# Patient Record
Sex: FEMALE | Race: WHITE | URBAN - METROPOLITAN AREA
[De-identification: names, ages, dates, MRNs, and addresses within clinical notes are randomized per-mention and may not be internally consistent; named-entity substitution may affect disease eponyms.]

---

## 2017-06-09 ENCOUNTER — THERAPY VISIT (OUTPATIENT)
Dept: PHYSICAL THERAPY | Facility: CLINIC | Age: 37
End: 2017-06-09
Payer: COMMERCIAL

## 2017-06-09 DIAGNOSIS — M79.676 GREAT TOE PAIN, UNSPECIFIED LATERALITY: Primary | ICD-10-CM

## 2017-06-09 DIAGNOSIS — Z47.89 AFTERCARE FOLLOWING SURGERY OF THE MUSCULOSKELETAL SYSTEM: ICD-10-CM

## 2017-06-09 PROCEDURE — 97110 THERAPEUTIC EXERCISES: CPT | Mod: GP | Performed by: PHYSICAL THERAPIST

## 2017-06-09 PROCEDURE — 97161 PT EVAL LOW COMPLEX 20 MIN: CPT | Mod: GP | Performed by: PHYSICAL THERAPIST

## 2017-06-09 NOTE — PROGRESS NOTES
Subjective:    Patient is a 36 year old female presenting with rehab right ankle/foot hpi.   Samra Go is a 36 year old female with a bilateral feet condition.  Condition occurred with:  Repetition/overuse.  Condition occurred: during recreation/sport.  This is a new and chronic condition  5/24/17 patient underwent bilateral 1st MTP cheilectomy with bone fragment removal from the right 1st IP joint.  She had hallux rigidus bilaterally due to years of dance.    Patient reports pain:  Great toe.  Radiates to:  No radiation.  Pain is described as aching and sharp and is intermittent and reported as 2/10.  Associated symptoms:  Loss of strength, loss of motion/stiffness and edema. Pain is the same all the time.  Symptoms are exacerbated by weight bearing, walking, ascending stairs, descending stairs, running and bending/squatting and relieved by rest and ice.  Since onset symptoms are gradually improving.  Special tests:  X-ray.  Previous treatment includes surgery.  Improvement with previous treatment: too early to guage improvement.  General health as reported by patient is good.  Pertinent medical history includes:  None.  Medical allergies: yes (sulfa medication).  Other surgeries include:  Orthopedic surgery (B feet).  Current medications:  Other (allergy meds, birth control).  Current occupation is [employer].  Patient is working in normal job without restrictions.  Primary job tasks include:  Prolonged sitting, driving and other (computer work).    Barriers include:  None as reported by patient.    Red flags:  None as reported by patient.                        Objective:  ANKLE:     PROM L PROM R AROM L AROM R MMT L MMT R   Dorsiflexion 20 20 20 20 5-/5 5-/5   Plantarflexion 60 60 60 60 5-/5 5-/5   Inversion 45 45 45 45 5-/5 5-/5   Eversion 15 15 15 15 5-/5 5-/5   G Toe Ext 25 25 nt nt nt nt   G Toe Flex 15 15 nt nt nt nt     Edema:    General: mild post-surgical edema noted in bilateral 1st MTP, Right  IP    Palpation: tender about surgical incisions bilaterally    Gait: mild antalgia, lacking normal toe off/push off bilaterally, slight roll to outer half of foot bilaterally    System    Physical Exam    General     ROS    Assessment/Plan:      Patient is a 36 year old female with bilateral ankle complaints.    Patient has the following significant findings with corresponding treatment plan.                Diagnosis 1:  S/p B 1st MTP cheilectomy    Pain -  hot/cold therapy, US, manual therapy, self management, education and home program  Decreased ROM/flexibility - manual therapy, therapeutic exercise and home program  Decreased joint mobility - manual therapy, therapeutic exercise and home program  Decreased strength - therapeutic exercise, therapeutic activities and home program  Decreased proprioception - neuro re-education, therapeutic activities and home program  Edema - cold therapy and self management/home program  Impaired gait - gait training and home program  Decreased function - therapeutic activities and home program    Therapy Evaluation Codes:   1) History comprised of:   Personal factors that impact the plan of care:      None.    Comorbidity factors that impact the plan of care are:      None.     Medications impacting care: None.  2) Examination of Body Systems comprised of:   Body structures and functions that impact the plan of care:      Toes.   Activity limitations that impact the plan of care are:      Jumping, Running, Squatting/kneeling, Stairs and Walking.  3) Clinical presentation characteristics are:   Stable/Uncomplicated.  4) Decision-Making    Low complexity using standardized patient assessment instrument and/or measureable assessment of functional outcome.  Cumulative Therapy Evaluation is: Low complexity.    Previous and current functional limitations:  (See Goal Flow Sheet for this information)    Short term and Long term goals: (See Goal Flow Sheet for this information)      Communication ability:  Patient appears to be able to clearly communicate and understand verbal and written communication and follow directions correctly.  Treatment Explanation - The following has been discussed with the patient:   RX ordered/plan of care  Anticipated outcomes  Possible risks and side effects  This patient would benefit from PT intervention to resume normal activities.   Rehab potential is good.    Frequency:  1 X week, once daily  Duration:  for 10 weeks  Discharge Plan:  Achieve all LTG.  Independent in home treatment program.  Reach maximal therapeutic benefit.    Please refer to the daily flowsheet for treatment today, total treatment time and time spent performing 1:1 timed codes.

## 2017-06-09 NOTE — LETTER
[facility]  [address]  [place] [address]  [phone]    2017    Re: Samra Go   :   1980  MRN:  [id number]   REFERRING PHYSICIAN:   [facility]    Date of Initial Evaluation:  17  Visits:  Rxs Used: 1  Reason for Referral:     Great toe pain, unspecified laterality  Aftercare following surgery of the musculoskeletal system    EVALUATION SUMMARY    Subjective:  Patient is a 36 year old female presenting with rehab right ankle/foot hpi.   Samra Go is a 36 year old female with a bilateral feet condition.  Condition occurred with:  Repetition/overuse.  Condition occurred: during recreation/sport.  This is a new and chronic condition  17 patient underwent bilateral 1st MTP cheilectomy with bone fragment removal from the right 1st IP joint.  She had hallux rigidus bilaterally due to years of dance.    Patient reports pain:  Great toe.  Radiates to:  No radiation.  Pain is described as aching and sharp and is intermittent and reported as 2/10.  Associated symptoms:  Loss of strength, loss of motion/stiffness and edema. Pain is the same all the time.  Symptoms are exacerbated by weight bearing, walking, ascending stairs, descending stairs, running and bending/squatting and relieved by rest and ice.  Since onset symptoms are gradually improving.  Special tests:  X-ray.  Previous treatment includes surgery.  Improvement with previous treatment: too early to guage improvement.  General health as reported by patient is good.  Pertinent medical history includes:  None.  Medical allergies: yes (sulfa medication).  Other surgeries include:  Orthopedic surgery (B feet).  Current medications:  Other (allergy meds, birth control).  Current occupation is [employer].  Patient is working in normal job without restrictions.  Primary job tasks include:  Prolonged sitting, driving and other (computer work).    Barriers include:  None as reported by  patient.    Red flags:  None as reported by patient.    Objective:  ANKLE:     PROM L PROM R AROM L AROM R MMT L MMT R   Dorsiflexion 20 20 20 20 5-/5 5-/5   Plantarflexion 60 60 60 60 5-/5 5-/5   Inversion 45 45 45 45 5-/5 5-/5   Eversion 15 15 15 15 5-/5 5-/5   G Toe Ext 25 25 nt nt nt nt   G Toe Flex 15 15 nt nt nt nt     Edema:    General: mild post-surgical edema noted in bilateral 1st MTP, Right IP    Palpation: tender about surgical incisions bilaterally    Gait: mild antalgia, lacking normal toe off/push off bilaterally, slight roll to outer half of foot bilaterally    Assessment/Plan:    Patient is a 36 year old female with bilateral ankle complaints.    Patient has the following significant findings with corresponding treatment plan.                Diagnosis 1:  S/p B 1st MTP cheilectomy    Pain -  hot/cold therapy, US, manual therapy, self management, education and home program  Decreased ROM/flexibility - manual therapy, therapeutic exercise and home program  Decreased joint mobility - manual therapy, therapeutic exercise and home program  Decreased strength - therapeutic exercise, therapeutic activities and home program  Decreased proprioception - neuro re-education, therapeutic activities and home program  Edema - cold therapy and self management/home program  Impaired gait - gait training and home program  Decreased function - therapeutic activities and home program    Therapy Evaluation Codes:   1) History comprised of:   Personal factors that impact the plan of care:      None.    Comorbidity factors that impact the plan of care are:      None.     Medications impacting care: None.  2) Examination of Body Systems comprised of:   Body structures and functions that impact the plan of care:      Toes.   Activity limitations that impact the plan of care are:      Jumping, Running, Squatting/kneeling, Stairs and Walking.  3) Clinical presentation characteristics  are:   Stable/Uncomplicated.  4) Decision-Making    Low complexity using standardized patient assessment instrument and/or measureable assessment of functional outcome.    Cumulative Therapy Evaluation is: Low complexity.  Previous and current functional limitations:  (See Goal Flow Sheet for this information)    Short term and Long term goals: (See Goal Flow Sheet for this information)   Communication ability:  Patient appears to be able to clearly communicate and understand verbal and written communication and follow directions correctly.  Treatment Explanation - The following has been discussed with the patient:   RX ordered/plan of care  Anticipated outcomes  Possible risks and side effects  This patient would benefit from PT intervention to resume normal activities.   Rehab potential is good.    Frequency:  1 X week, once daily  Duration:  for 10 weeks  Discharge Plan:  Achieve all LTG.  Independent in home treatment program.  Reach maximal therapeutic benefit.    Thank you for your referral.    INQUIRIES  Therapist: Alberto Nathan DPT, SCS  [facility]  [address] [place] [address]  Phone: [phone]  Fax: [phone]

## 2017-06-16 ENCOUNTER — THERAPY VISIT (OUTPATIENT)
Dept: PHYSICAL THERAPY | Facility: CLINIC | Age: 37
End: 2017-06-16
Payer: COMMERCIAL

## 2017-06-16 DIAGNOSIS — Z47.89 AFTERCARE FOLLOWING SURGERY OF THE MUSCULOSKELETAL SYSTEM: ICD-10-CM

## 2017-06-16 DIAGNOSIS — M79.676 GREAT TOE PAIN, UNSPECIFIED LATERALITY: ICD-10-CM

## 2017-06-16 PROCEDURE — 97140 MANUAL THERAPY 1/> REGIONS: CPT | Mod: GP | Performed by: PHYSICAL THERAPIST

## 2017-06-16 PROCEDURE — 97110 THERAPEUTIC EXERCISES: CPT | Mod: GP | Performed by: PHYSICAL THERAPIST

## 2017-06-22 ENCOUNTER — THERAPY VISIT (OUTPATIENT)
Dept: PHYSICAL THERAPY | Facility: CLINIC | Age: 37
End: 2017-06-22
Payer: COMMERCIAL

## 2017-06-22 DIAGNOSIS — Z47.89 AFTERCARE FOLLOWING SURGERY OF THE MUSCULOSKELETAL SYSTEM: ICD-10-CM

## 2017-06-22 DIAGNOSIS — M79.676 GREAT TOE PAIN, UNSPECIFIED LATERALITY: ICD-10-CM

## 2017-06-22 PROCEDURE — 97110 THERAPEUTIC EXERCISES: CPT | Mod: GP | Performed by: PHYSICAL THERAPIST

## 2017-06-22 PROCEDURE — 97140 MANUAL THERAPY 1/> REGIONS: CPT | Mod: GP | Performed by: PHYSICAL THERAPIST

## 2017-06-26 ENCOUNTER — THERAPY VISIT (OUTPATIENT)
Dept: PHYSICAL THERAPY | Facility: CLINIC | Age: 37
End: 2017-06-26
Payer: COMMERCIAL

## 2017-06-26 DIAGNOSIS — M79.676 GREAT TOE PAIN, UNSPECIFIED LATERALITY: ICD-10-CM

## 2017-06-26 DIAGNOSIS — Z47.89 AFTERCARE FOLLOWING SURGERY OF THE MUSCULOSKELETAL SYSTEM: ICD-10-CM

## 2017-06-26 PROCEDURE — 97110 THERAPEUTIC EXERCISES: CPT | Mod: GP | Performed by: PHYSICAL THERAPIST

## 2017-06-26 PROCEDURE — 97140 MANUAL THERAPY 1/> REGIONS: CPT | Mod: GP | Performed by: PHYSICAL THERAPIST

## 2018-02-27 PROBLEM — M79.676: Status: RESOLVED | Noted: 2017-06-09 | Resolved: 2018-02-27

## 2018-02-27 PROBLEM — Z47.89 AFTERCARE FOLLOWING SURGERY OF THE MUSCULOSKELETAL SYSTEM: Status: RESOLVED | Noted: 2017-06-09 | Resolved: 2018-02-27

## 2018-02-27 NOTE — PROGRESS NOTES
Subjective:  HPI                    Objective:  System    Physical Exam    General     ROS    Assessment/Plan:    DISCHARGE REPORT    Progress reporting period is from 6/9/17 to 6/26/17.       SUBJECTIVE  Subjective changes noted by patient:  Was not feeling well over the weekend so did not do as much of her exercises as usual.  Walked 3+ hours in tennis shoes and feet were very sore and mildly swollen by the end of the day.  Overall still doing pretty well.    Current pain level is  1/10.     Previous pain level was 2/10.   Changes in function:  Yes, see above.  Adverse reaction to treatment or activity: None    OBJECTIVE  Changes noted in objective findings:  Yes  B 1st MTP joint mobility improved. Increased PF ROM with calf raises     ASSESSMENT/PLAN  Updated problem list and treatment plan: Diagnosis 1:  S/p B 1st MTP cheilectomy    Pain -  home program  Decreased ROM/flexibility - home program  Decreased strength - home program  Decreased proprioception - home program  STG/LTGs have been met or progress has been made towards goals:  Yes (See Goal flow sheet completed today.)  Assessment of Progress: The patient's condition is improving.  The patient's condition has potential to improve.  Self Management Plans:  Patient has been instructed in a home treatment program.  Patient  has been instructed in self management of symptoms.    Samra continues to require the following intervention to meet STG and LTG's:  PT intervention is no longer required to meet STG/LTG.    Recommendations:  This patient is ready to be discharged from therapy and continue their home treatment program.    Please refer to the daily flowsheet for treatment today, total treatment time and time spent performing 1:1 timed codes.

## 2020-03-31 ENCOUNTER — TELEPHONE (OUTPATIENT)
Dept: ORTHOPEDICS | Facility: CLINIC | Age: 40
End: 2020-03-31

## 2023-02-07 ENCOUNTER — LAB REQUISITION (OUTPATIENT)
Dept: LAB | Facility: CLINIC | Age: 43
End: 2023-02-07

## 2023-02-07 DIAGNOSIS — Z01.419 ENCOUNTER FOR GYNECOLOGICAL EXAMINATION (GENERAL) (ROUTINE) WITHOUT ABNORMAL FINDINGS: ICD-10-CM

## 2023-02-07 PROCEDURE — 87624 HPV HI-RISK TYP POOLED RSLT: CPT | Performed by: NURSE PRACTITIONER

## 2023-02-07 PROCEDURE — G0145 SCR C/V CYTO,THINLAYER,RESCR: HCPCS | Performed by: NURSE PRACTITIONER

## 2023-02-11 LAB
BKR LAB AP GYN ADEQUACY: NORMAL
BKR LAB AP GYN INTERPRETATION: NORMAL
BKR LAB AP HPV REFLEX: NORMAL
BKR LAB AP LMP: NORMAL
BKR LAB AP PREVIOUS ABNL DX: NORMAL
BKR LAB AP PREVIOUS ABNORMAL: NORMAL
PATH REPORT.COMMENTS IMP SPEC: NORMAL
PATH REPORT.COMMENTS IMP SPEC: NORMAL
PATH REPORT.RELEVANT HX SPEC: NORMAL

## 2023-02-15 LAB
HUMAN PAPILLOMA VIRUS 16 DNA: NEGATIVE
HUMAN PAPILLOMA VIRUS 18 DNA: NEGATIVE
HUMAN PAPILLOMA VIRUS FINAL DIAGNOSIS: NORMAL
HUMAN PAPILLOMA VIRUS OTHER HR: NEGATIVE

## 2023-03-08 ENCOUNTER — LAB REQUISITION (OUTPATIENT)
Dept: LAB | Facility: CLINIC | Age: 43
End: 2023-03-08

## 2023-03-08 DIAGNOSIS — Z01.419 ENCOUNTER FOR GYNECOLOGICAL EXAMINATION (GENERAL) (ROUTINE) WITHOUT ABNORMAL FINDINGS: ICD-10-CM

## 2023-03-08 LAB
CHOLEST SERPL-MCNC: 227 MG/DL
FASTING STATUS PATIENT QL REPORTED: YES
GLUCOSE SERPL-MCNC: 82 MG/DL (ref 70–99)
HDLC SERPL-MCNC: 69 MG/DL
LDLC SERPL CALC-MCNC: 146 MG/DL
NONHDLC SERPL-MCNC: 158 MG/DL
TRIGL SERPL-MCNC: 59 MG/DL

## 2023-03-08 PROCEDURE — 80061 LIPID PANEL: CPT | Performed by: NURSE PRACTITIONER

## 2023-03-08 PROCEDURE — 82947 ASSAY GLUCOSE BLOOD QUANT: CPT | Performed by: NURSE PRACTITIONER

## 2024-02-09 ENCOUNTER — LAB REQUISITION (OUTPATIENT)
Dept: LAB | Facility: CLINIC | Age: 44
End: 2024-02-09

## 2024-02-09 DIAGNOSIS — Z01.419 ENCOUNTER FOR GYNECOLOGICAL EXAMINATION (GENERAL) (ROUTINE) WITHOUT ABNORMAL FINDINGS: ICD-10-CM

## 2024-02-09 LAB — HOLD SPECIMEN: NORMAL

## 2024-02-09 PROCEDURE — 82947 ASSAY GLUCOSE BLOOD QUANT: CPT | Performed by: NURSE PRACTITIONER

## 2024-02-09 PROCEDURE — 80061 LIPID PANEL: CPT | Performed by: NURSE PRACTITIONER

## 2024-02-10 LAB
CHOLEST SERPL-MCNC: 220 MG/DL
FASTING STATUS PATIENT QL REPORTED: YES
FASTING STATUS PATIENT QL REPORTED: YES
GLUCOSE SERPL-MCNC: 90 MG/DL (ref 70–99)
HDLC SERPL-MCNC: 70 MG/DL
LDLC SERPL CALC-MCNC: 138 MG/DL
NONHDLC SERPL-MCNC: 150 MG/DL
TRIGL SERPL-MCNC: 61 MG/DL